# Patient Record
Sex: MALE | Race: BLACK OR AFRICAN AMERICAN
[De-identification: names, ages, dates, MRNs, and addresses within clinical notes are randomized per-mention and may not be internally consistent; named-entity substitution may affect disease eponyms.]

---

## 2021-09-28 ENCOUNTER — HOSPITAL ENCOUNTER (EMERGENCY)
Dept: HOSPITAL 63 - ER | Age: 26
Discharge: HOME | End: 2021-09-28
Payer: COMMERCIAL

## 2021-09-28 VITALS — DIASTOLIC BLOOD PRESSURE: 76 MMHG | SYSTOLIC BLOOD PRESSURE: 148 MMHG

## 2021-09-28 VITALS — HEIGHT: 68 IN | BODY MASS INDEX: 38.16 KG/M2 | WEIGHT: 251.77 LBS
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
## 2021-09-28 DIAGNOSIS — S43.085A: Primary | ICD-10-CM
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
## 2021-09-28 PROCEDURE — 73030 X-RAY EXAM OF SHOULDER: CPT

## 2021-09-28 PROCEDURE — 96360 HYDRATION IV INFUSION INIT: CPT

## 2021-09-28 PROCEDURE — 99285 EMERGENCY DEPT VISIT HI MDM: CPT

## 2021-09-28 PROCEDURE — 23650 CLTX SHO DSLC W/MNPJ WO ANES: CPT

## 2021-09-28 NOTE — PHYS DOC
General Adult


EDM:


Chief Complaint:  SHOULDER INJURY





HPI:


HPI:


26-year-old -American male presents to the ED from custodial with complaints 

of left shoulder pain, concern for dislocation after patient was doing bicep 

curls in the gym.  Patient reports he's dislocated this joint multiple times and

usually can get the joint back in himself.  Patient received 100 mcg of fentanyl

intranasal, 100 mcg of fentanyl IV, 30 mg of Toradol IV and 1 mg of Ativan IV 

pta. No h/o blunt injury.





Review of Systems:


Review of Systems:


Constitutional:  Denies fever or chills 


Eyes:  Denies change in visual acuity 


HENT:  Denies nasal congestion or sore throat 


Respiratory:  Denies cough or shortness of breath 


Cardiovascular:  Denies chest pain or edema 


GI:  Denies  nausea or vomiting, 


: Denies saddle anesthesia or incontinence


Musculoskeletal:  Denies back pain or flank pain


Integument:  Denies rash or diaphoresis


Neurologic:  Denies focal weakness or sensory changes 


Endocrine:  Denies polyuria or polydipsia 


Lymphatic:  Denies swollen glands 


Psychiatric:  Denies depression or anxiety





Physical Exam:


PE:





Constitutional: Well-developed, nontoxic-appearing, in pain


HENT: Normocephalic, atraumatic, moist mucous membranes, large tonsils 

approximately 80% occlusion of oropharynx with no erythema or exudates, 

Mallampati 2


Eyes: EOMI, conjunctiva normal, no discharge.  


Neck: Normal range of motion,  supple, 


Cardiovascular: S1/2 present, regular rhythm


Lungs & Thorax: Speaking in full sentences, bilateral equal chest rise, no 

tachypnea or increased work of breathing


Abdomen:  soft, no tenderness, 


Skin: Warm, dry, no erythema, no rash. [] 


Back: No tenderness, no CVA tenderness. [] 


Extremities: Equal radial pulses, axillary/median/radial/ulnar nerve sensation 

intact, left prominent anterior shoulder deformity


Neurologic: Alert and oriented X 3, normal sensory function, no focal deficits 

noted, no restricted left elbow or wrist range of motion


Psychologic: Affect normal, judgement normal, mood normal. []





EKG:


EKG:


[]





Radiology/Procedures:


Radiology/Procedures:


IMAGING REPORT





                                     Signed





PATIENT: ANGE SENA      ACCOUNT: [id number]     MRN#: [id number]


: 1995           LOCATION: ER              AGE: 26


SEX: M                    EXAM DT: 21         ACCESSION#: [id number]


STATUS: PRE ER            ORD. PHYSICIAN: PHILLIP JIMENEZ DO


REASON: anteiror shoulder


PROCEDURE: SHOULDER 2+V LEFT





AP Internal and external rotation views of the left shoulder were performed.





Indication: Anterior shoulder dislocation 


 


Comparison:  None. 


 


There is anterior dislocation of the humeral head relative to glenoid. No 

obvious fractures identified. Recommend postreduction imaging.





Electronically signed by: Ryan Hawk MD (2021 4:17 PM) [facility]














DICTATED AND SIGNED BY:     RYAN HAWK MD


DATE:     21 1615





CC: PHILLIP JIMENEZ DO ~MTH0 0


                                 IMAGING REPORT





                                     Signed





PATIENT: ANGE SENA      ACCOUNT: [id number]     MRN#: [id number]


: 1995           LOCATION: ER              AGE: 26


SEX: M                    EXAM DT: 21         ACCESSION#: [id number]


STATUS: REG ER            ORD. PHYSICIAN: PHILLIP JIMENEZ DO


REASON: POST REDUCTION, AP AND Y VIEW ONLY PER DR


PROCEDURE: SHOULDER 2+V LEFT





Study: XR SHOULDER_LEFT 2+ VIEWS





Indication: Postreduction.





Comparison: 2021 at 1554 hours.





Findings:





The anterior/inferior glenohumeral joint dislocation on the comparison has been 

reduced. No definitive osseous Bankart fracture. Incomplete assessment for a 

Hill-Sachs impaction deformity. The humeral head appears to be high riding but 

this is at least in part projectional. No malalignment across the 

acromioclavicular joint.





The partially assessed left-sided ribs are grossly intact. The cardiomediastinal

 silhouette appears to be prominent in size but this is favored mostly 

projectional. Mandibular surgical hardware on the left.





Impression:





Glenohumeral joint dislocation has been reduced in the interim. Limited 

assessment for any associated fractures but none is readily apparent. 





Electronically signed by: CINDI EDMONDSON MD (2021 4:49 PM) [facility]














DICTATED AND SIGNED BY:     CINDI EDMONDSON MD


DATE:     21 1646





CC: PCP,NO; PHILLIP JIMENEZ DO ~MTH0 0





Indication: Left anterior shoulder dislocation





Consent: Provided by patient





Procedure: The pre-reduction exam showed normal axillary/median/radial/ulnar 

nerve sensation intact. The patient was placed in supine position. 

Anesthesia/pain control with Versed and ketamine. Reduction of the left shoulder

 was performed by traction/countertraction. Post reduction films did not 

indicate any obvious fracture-cannot determine Hill-Sachs deformity. A post-

reduction exam revealed no change. The affected area was immobilized with 

shoulder immobilizer.





The patient tolerated the procedure .





Complications: None











Indication: Left anterior shoulder dislocation


Consent: Provided by patient


Physician Involvement: The attending physician performed this procedure.


Pre-Sedation Documentation and Exam: ASA 1   


Airway Assessment: Mallampati 2


Prior History of Anesthesia Complications: no


Sedation/ Anesthesia Plan: Versed and ketamine


Monitoring and Safety: The patient was placed on a cardiac monitor and vital 

signs, pulse oximetry and level of consciousness were continuously evaluated 

throughout the procedure. The patient was closely monitored until recovery from 

the medications was complete and the patient had returned to baseline status. Re

spiratory therapy was on standby at all times during the procedure.


(The following sections must be completed)


Post-Sedation Vital Signs: Stable


Post-Sedation Exam: Alert and oriented with steady gait and medical decision-

making capacity, pain well controlled    


Complications: None





Heart Score:


C/O Chest Pain:  No


Risk Factors:


Risk Factors:  DM, Current or recent (<one month) smoker, HTN, HLP, family 

history of CAD, obesity.


Risk Scores:


Score 0 - 3:  2.5% MACE over next 6 weeks - Discharge Home


Score 4 - 6:  20.3% MACE over next 6 weeks - Admit for Clinical Observation


Score 7 - 10:  72.7% MACE over next 6 weeks - Early Invasive Strategies





Course & Med Decision Making:


Course & Med Decision Making


Pertinent Labs and Imaging studies reviewed. (See chart for details)





Concern for left anterior shoulder dislocation status post reduction, placed in 

shoulder sling.  Patient hemodynamically stable and neurovascularly intact.  Per

 correctional officers, I am unavailable to prescribe narcotic medications. Pts'

 pain is well controlled at time of discharge. Will discharge home with strict 

ED return precautions were given for repeat injury, neurologic deficits, skin 

color changes or severe pain. Encouraged urgent outpatient follow-up with PMD 

and orthopedic surgery for definitive management.  Life-threatening processes 

were considered but are low suspicion at this time, given history, physical exam

 and ED workup. Pt was educated on all prescription medications and adverse 

effects.  All patient's questions were answered and pt was stable at time of 

discharge.





Life/limb-threatening differential includes but is not limited to, avascular 

necrosis, septic arthritis, malignancy, compartment syndrome, fracture/ligament

ous injury/overuse, decompression sickness, seronegative spondyloarthropathies, 

trauma including dislocation/fracture, Lyme disease, lupus, arthritis 

differentials, gout/pseudogout or decompression sickness. 





I have spoken with the patient and/or caregivers.  I explained the patient's 

condition, diagnoses and treatment plan based on the information available to me

 at this time.  I have answered the patient and/or caregiver's questions and 

addressed any concerns.  The patient and/or caregivers have a good understanding

 of patient's diagnosis, condition and treatment plan as can be expected at this

 point.  Vital signs have been stable.  Patient's condition is stable and 

appropriate for discharge from the emergency department. 





Patient will pursue further outpatient evaluation with primary care physician or

 other designated or consulting physician as outlined in the discharge instruct

ions.  The patient and/or caregivers are agreeable to this plan of care and 

follow-up instructions have been explained in detail.  The patient and/or 

caregivers have received these instructions in written form and have expressed 

an understanding of the discharge instructions.  The patient and/or caregivers 

are aware that any significant change of condition or worsening of symptoms 

should prompt immediate return to this or the closest emergency department or 

call to 911.





Mal Disclaimer:


Dragon Disclaimer:


This electronic medical record was generated, in whole or in part, using a voice

 recognition dictation system.





Departure


Departure:


Impression:  


   Primary Impression:  


   Anterior dislocation of left shoulder


Disposition:  01 HOME / SELF CARE / HOMELESS


Condition:  STABLE


Referrals:  


EDWIGE HOLLY MD


Follow up with your pcp in 1-2 days or


[facility]


[phone] 


OR


[facility]


[phone]


Patient Instructions:  Arm Sling Use, Easy-to-Read, Sedation, Moderate, Adult, 

Shoulder Dislocation





Additional Instructions:  


FOLLOW UP WITH ORTHOPEDICS: within 1-2 weeks


[facility]


[address] [facility], [address] [address]


[phone]





EMERGENCY DEPARTMENT GENERAL DISCHARGE INSTRUCTIONS





Thank you for coming to [facility] (ED) today and trusting us

 with you 


care.  We trust that you had a positivie experience in our Emergency Department.

  If you 


wish to speak to the department management, you may call the director at 

[phone].





YOUR FOLLOW UP INSTRUCTIONS ARE AS FOLLOWS:





1.  Do you have a private Doctor?  If you do not have a private doctor, please 

ask for a 


resource list of physicians or clinics that may be able to assist you with 

follow up care.





2.  The Emergency Physician has interpreted your x-rays.  The X-Ray specialist 

will also 


review them.  If there is a change in the findings, you will be notified in 48 

hours when at 


all possible.





3.  A lab test or culture has been done, your results will be reviewed and you 

will be 


notified if you need a change in treatment.





ADDITIONAL INSTRUCTIONS AND INFORMATION:





1.  Your care today has been supervised by a physician who is specially trained 

in emergency 


care.  Many problems require more than one evaluation for a complete diagnosis 

and 


treatment.  We recommend that you schedule your follow up appointment as 

recommended to 


ensure complete treatment of you illness or injury.  If you are unable to obtain

 follow up 


care and continue to have a problem, or if your condition worsens, we recommend 

that you 


return to the ED.





2.  We are not able to safely determine your condition over the phone nor are we

 able to 


give sound medical advice over the phone.  For these safety reasons, if you call

 for medical 


advice we will ask you to come to the ED for further evaluation.





3.  If you have any questions regarding these discharge instructions please call

 the ED at 


[phone].





SAFETY INFORMATION:





In the interest of safety, wellness, and injury prevention; we encourage you to 

wear your 


sealbelt, if you smoke; quite smoking, and we encourage family to use a 

protective helmet 


for bicycling and other sporting events that present an increased risk for head 

injury.





IF YOUR SYMPTOMS WORSEN OR NEW SYMPTOMS DEVELOP, OR YOU HAVE CONCERNS ABOUT YOUR

 CONDITION; 


OR IF YOUR CONDITION WORSENS WHILE YOU ARE WAITING FOR YOUR FOLLOW UP 

APPOINTMENT; EITHER 


CONTACT YOUR PRIMARY CARE DOCTOR, THE PHYSICIAN WHOSE NAME AND NUMBER YOU WERE 

GIVEN, OR 


RETURN TO THE ED IMMEDIATELY.











PHILLIP JIMENEZ DO               Sep 28, 2021 15:56

## 2021-09-28 NOTE — RAD
AP Internal and external rotation views of the left shoulder were performed.



Indication: Anterior shoulder dislocation 

 

Comparison:  None. 

 

There is anterior dislocation of the humeral head relative to glenoid. No obvious fractures identifie
d. Recommend postreduction imaging.



Electronically signed by: Ryan Hawk MD (9/28/2021 4:17 PM) [facility]

## 2021-09-28 NOTE — RAD
Study: XR SHOULDER_LEFT 2+ VIEWS



Indication: Postreduction.



Comparison: 9/28/2021 at 1554 hours.



Findings:



The anterior/inferior glenohumeral joint dislocation on the comparison has been reduced. No definitiv
e osseous Bankart fracture. Incomplete assessment for a Hill-Sachs impaction deformity. The humeral h
ead appears to be high riding but this is at least in part projectional. No malalignment across the a
cromioclavicular joint.



The partially assessed left-sided ribs are grossly intact. The cardiomediastinal silhouette appears t
o be prominent in size but this is favored mostly projectional. Mandibular surgical hardware on the l
eft.



Impression:



Glenohumeral joint dislocation has been reduced in the interim. Limited assessment for any associated
 fractures but none is readily apparent. 



Electronically signed by: CINDI EDMONDSON MD (9/28/2021 4:49 PM) [facility]